# Patient Record
Sex: MALE | Race: WHITE
[De-identification: names, ages, dates, MRNs, and addresses within clinical notes are randomized per-mention and may not be internally consistent; named-entity substitution may affect disease eponyms.]

---

## 2019-12-10 ENCOUNTER — HOSPITAL ENCOUNTER (INPATIENT)
Dept: HOSPITAL 84 - D.ER | Age: 49
LOS: 2 days | Discharge: HOME | DRG: 419 | End: 2019-12-12
Attending: SURGERY | Admitting: SURGERY
Payer: COMMERCIAL

## 2019-12-10 VITALS — SYSTOLIC BLOOD PRESSURE: 138 MMHG | DIASTOLIC BLOOD PRESSURE: 89 MMHG

## 2019-12-10 VITALS — DIASTOLIC BLOOD PRESSURE: 93 MMHG | SYSTOLIC BLOOD PRESSURE: 151 MMHG

## 2019-12-10 VITALS — SYSTOLIC BLOOD PRESSURE: 149 MMHG | DIASTOLIC BLOOD PRESSURE: 96 MMHG

## 2019-12-10 VITALS — BODY MASS INDEX: 29.16 KG/M2 | BODY MASS INDEX: 29.16 KG/M2 | HEIGHT: 73 IN | WEIGHT: 220 LBS

## 2019-12-10 VITALS — SYSTOLIC BLOOD PRESSURE: 138 MMHG | DIASTOLIC BLOOD PRESSURE: 87 MMHG

## 2019-12-10 VITALS — DIASTOLIC BLOOD PRESSURE: 94 MMHG | SYSTOLIC BLOOD PRESSURE: 147 MMHG

## 2019-12-10 VITALS — SYSTOLIC BLOOD PRESSURE: 91 MMHG | DIASTOLIC BLOOD PRESSURE: 58 MMHG

## 2019-12-10 DIAGNOSIS — Z72.0: ICD-10-CM

## 2019-12-10 DIAGNOSIS — R16.0: ICD-10-CM

## 2019-12-10 DIAGNOSIS — K80.00: Primary | ICD-10-CM

## 2019-12-10 LAB
ALBUMIN SERPL-MCNC: 4 G/DL (ref 3.4–5)
ALP SERPL-CCNC: 52 U/L (ref 46–116)
ALT SERPL-CCNC: 32 U/L (ref 10–68)
AMORPHOUS SEDIMENT: (no result) /LPF
AMYLASE SERPL-CCNC: 45 U/L (ref 25–115)
ANION GAP SERPL CALC-SCNC: 9.6 MMOL/L (ref 8–16)
APPEARANCE UR: (no result)
BACTERIA #/AREA URNS HPF: (no result) /HPF
BASOPHILS NFR BLD AUTO: 0.3 % (ref 0–2)
BILIRUB SERPL-MCNC: 0.43 MG/DL (ref 0.2–1.3)
BILIRUB SERPL-MCNC: NEGATIVE MG/DL
BUN SERPL-MCNC: 15 MG/DL (ref 7–18)
CALCIUM SERPL-MCNC: 9.9 MG/DL (ref 8.5–10.1)
CHLORIDE SERPL-SCNC: 105 MMOL/L (ref 98–107)
CO2 SERPL-SCNC: 27.3 MMOL/L (ref 21–32)
COLOR UR: YELLOW
CREAT SERPL-MCNC: 1.1 MG/DL (ref 0.6–1.3)
EOSINOPHIL NFR BLD: 1.5 % (ref 0–7)
ERYTHROCYTE [DISTWIDTH] IN BLOOD BY AUTOMATED COUNT: 12 % (ref 11.5–14.5)
GLOBULIN SER-MCNC: 4 G/L
GLUCOSE SERPL-MCNC: 122 MG/DL (ref 74–106)
GLUCOSE SERPL-MCNC: NEGATIVE MG/DL
HCT VFR BLD CALC: 43.7 % (ref 42–54)
HGB BLD-MCNC: 15.1 G/DL (ref 13.5–17.5)
IMM GRANULOCYTES NFR BLD: 0.1 % (ref 0–5)
KETONES UR STRIP-MCNC: NEGATIVE MG/DL
LIPASE SERPL-CCNC: 129 U/L (ref 73–393)
LYMPHOCYTES NFR BLD AUTO: 19.2 % (ref 15–50)
MAGNESIUM SERPL-MCNC: 2 MG/DL (ref 1.8–2.4)
MCH RBC QN AUTO: 31.4 PG (ref 26–34)
MCHC RBC AUTO-ENTMCNC: 34.6 G/DL (ref 31–37)
MCV RBC: 90.9 FL (ref 80–100)
MONOCYTES NFR BLD: 6 % (ref 2–11)
NEUTROPHILS NFR BLD AUTO: 72.9 % (ref 40–80)
NITRITE UR-MCNC: NEGATIVE MG/ML
OSMOLALITY SERPL CALC.SUM OF ELEC: 277 MOSM/KG (ref 275–300)
PH UR STRIP: 9 [PH] (ref 5–6)
PLATELET # BLD: 188 10X3/UL (ref 130–400)
PMV BLD AUTO: 10.2 FL (ref 7.4–10.4)
POTASSIUM SERPL-SCNC: 3.9 MMOL/L (ref 3.5–5.1)
PROT SERPL-MCNC: 8 G/DL (ref 6.4–8.2)
PROT UR-MCNC: NEGATIVE MG/DL
RBC # BLD AUTO: 4.81 10X6/UL (ref 4.2–6.1)
RBC #/AREA URNS HPF: (no result) /HPF (ref 0–5)
SODIUM SERPL-SCNC: 138 MMOL/L (ref 136–145)
SP GR UR STRIP: 1.01 (ref 1–1.02)
SQUAMOUS #/AREA URNS HPF: (no result) /HPF (ref 0–5)
TROPONIN I SERPL-MCNC: < 0.017 NG/ML (ref 0–0.06)
UROBILINOGEN UR-MCNC: NORMAL MG/DL
WBC # BLD AUTO: 6.8 10X3/UL (ref 4.8–10.8)
WBC #/AREA URNS HPF: (no result) /HPF

## 2019-12-10 NOTE — NUR
MAX LIMIT REACHED ON PCA DEVICE. SHOWED PATIENT SILENT BUTTON. NO FAMILY AT
THIS TIME. CL IN REACH. WCTM

## 2019-12-10 NOTE — NUR
A&O X 4, FAMILY AT BEDSIDE. REPORTS ADEQUATE PAIN CONTROL WITH PCA, DENIES
NEEDS AT THIS TIME. WILL CONTINUE TO MONITOR.

## 2019-12-11 VITALS — DIASTOLIC BLOOD PRESSURE: 83 MMHG | SYSTOLIC BLOOD PRESSURE: 133 MMHG

## 2019-12-11 VITALS — SYSTOLIC BLOOD PRESSURE: 138 MMHG | DIASTOLIC BLOOD PRESSURE: 89 MMHG

## 2019-12-11 VITALS — DIASTOLIC BLOOD PRESSURE: 78 MMHG | SYSTOLIC BLOOD PRESSURE: 134 MMHG

## 2019-12-11 VITALS — SYSTOLIC BLOOD PRESSURE: 140 MMHG | DIASTOLIC BLOOD PRESSURE: 84 MMHG

## 2019-12-11 LAB
ALBUMIN SERPL-MCNC: 3.2 G/DL (ref 3.4–5)
ALP SERPL-CCNC: 44 U/L (ref 46–116)
ALT SERPL-CCNC: 22 U/L (ref 10–68)
ANION GAP SERPL CALC-SCNC: 10.8 MMOL/L (ref 8–16)
BASOPHILS NFR BLD AUTO: 0.1 % (ref 0–2)
BILIRUB SERPL-MCNC: 0.55 MG/DL (ref 0.2–1.3)
BUN SERPL-MCNC: 7 MG/DL (ref 7–18)
CALCIUM SERPL-MCNC: 7.8 MG/DL (ref 8.5–10.1)
CHLORIDE SERPL-SCNC: 106 MMOL/L (ref 98–107)
CO2 SERPL-SCNC: 26.9 MMOL/L (ref 21–32)
CREAT SERPL-MCNC: 0.9 MG/DL (ref 0.6–1.3)
EOSINOPHIL NFR BLD: 1.5 % (ref 0–7)
ERYTHROCYTE [DISTWIDTH] IN BLOOD BY AUTOMATED COUNT: 12.3 % (ref 11.5–14.5)
GLOBULIN SER-MCNC: 3.2 G/L
GLUCOSE SERPL-MCNC: 99 MG/DL (ref 74–106)
HCT VFR BLD CALC: 39.6 % (ref 42–54)
HGB BLD-MCNC: 13.3 G/DL (ref 13.5–17.5)
IMM GRANULOCYTES NFR BLD: 0.3 % (ref 0–5)
LYMPHOCYTES NFR BLD AUTO: 17.8 % (ref 15–50)
MAGNESIUM SERPL-MCNC: 1.6 MG/DL (ref 1.8–2.4)
MCH RBC QN AUTO: 31.3 PG (ref 26–34)
MCHC RBC AUTO-ENTMCNC: 33.6 G/DL (ref 31–37)
MCV RBC: 93.2 FL (ref 80–100)
MONOCYTES NFR BLD: 11.9 % (ref 2–11)
NEUTROPHILS NFR BLD AUTO: 68.4 % (ref 40–80)
OSMOLALITY SERPL CALC.SUM OF ELEC: 276 MOSM/KG (ref 275–300)
PHOSPHATE SERPL-MCNC: 3.6 MG/DL (ref 2.5–4.9)
PLATELET # BLD: 159 10X3/UL (ref 130–400)
PMV BLD AUTO: 10.5 FL (ref 7.4–10.4)
POTASSIUM SERPL-SCNC: 3.7 MMOL/L (ref 3.5–5.1)
PROT SERPL-MCNC: 6.4 G/DL (ref 6.4–8.2)
RBC # BLD AUTO: 4.25 10X6/UL (ref 4.2–6.1)
SODIUM SERPL-SCNC: 140 MMOL/L (ref 136–145)
WBC # BLD AUTO: 7.9 10X3/UL (ref 4.8–10.8)

## 2019-12-11 NOTE — NUR
PT ALERT X 4. BREATH SOUNDS CLEAR BILAT. IV TO LEFT HAND, PATENT, DRESSING
CDI. PT EATING BREAKFAST. PT INSTRUCTED TO BE NPO AND NO PAIN MEDICATION AFTER
BREAKFAST FOR ORDERED SCANS. PT REPORTING NO PAIN AT THIS TIME. BED LOW, CALL
LIGHT IN REACH. NO OTHER NEEDS AT THIS TIME.

## 2019-12-12 VITALS — SYSTOLIC BLOOD PRESSURE: 138 MMHG | DIASTOLIC BLOOD PRESSURE: 84 MMHG

## 2019-12-12 VITALS — DIASTOLIC BLOOD PRESSURE: 63 MMHG | SYSTOLIC BLOOD PRESSURE: 113 MMHG

## 2019-12-12 VITALS — SYSTOLIC BLOOD PRESSURE: 156 MMHG | DIASTOLIC BLOOD PRESSURE: 86 MMHG

## 2019-12-12 VITALS — SYSTOLIC BLOOD PRESSURE: 107 MMHG | DIASTOLIC BLOOD PRESSURE: 66 MMHG

## 2019-12-12 VITALS — SYSTOLIC BLOOD PRESSURE: 129 MMHG | DIASTOLIC BLOOD PRESSURE: 65 MMHG

## 2019-12-12 VITALS — DIASTOLIC BLOOD PRESSURE: 80 MMHG | SYSTOLIC BLOOD PRESSURE: 127 MMHG

## 2019-12-12 VITALS — DIASTOLIC BLOOD PRESSURE: 84 MMHG | SYSTOLIC BLOOD PRESSURE: 138 MMHG

## 2019-12-12 VITALS — SYSTOLIC BLOOD PRESSURE: 120 MMHG | DIASTOLIC BLOOD PRESSURE: 76 MMHG

## 2019-12-12 VITALS — SYSTOLIC BLOOD PRESSURE: 121 MMHG | DIASTOLIC BLOOD PRESSURE: 60 MMHG

## 2019-12-12 VITALS — DIASTOLIC BLOOD PRESSURE: 70 MMHG | SYSTOLIC BLOOD PRESSURE: 109 MMHG

## 2019-12-12 PROCEDURE — BF121ZZ FLUOROSCOPY OF GALLBLADDER USING LOW OSMOLAR CONTRAST: ICD-10-PCS | Performed by: SURGERY

## 2019-12-12 PROCEDURE — 0FB04ZX EXCISION OF LIVER, PERCUTANEOUS ENDOSCOPIC APPROACH, DIAGNOSTIC: ICD-10-PCS | Performed by: SURGERY

## 2019-12-12 PROCEDURE — 0FT44ZZ RESECTION OF GALLBLADDER, PERCUTANEOUS ENDOSCOPIC APPROACH: ICD-10-PCS | Performed by: SURGERY

## 2019-12-12 NOTE — NUR
PT DISCHARGED. IV TAKEN OUT. DISCHARGE PACKET GIVEN. WRITTEN SCRIPTS GIVEN. PT
ALERT AND ORINETED. NO SIGNS OF DISTRESS. PT STABLE UPON DISCHARGE.

## 2019-12-12 NOTE — NUR
PAIN IMPROVED AFTER USE OF PCA TO CALM ABDOMEN DOWN. WILL MONITOR. UP
AMBULATED IN HALLWAY PER SELF. NO CHANGES NOTED.

## 2019-12-12 NOTE — NUR
AMBULATED IN HALLWAY WITH  FEET. ATE MOST OF REGULAR LUNCH TRAY WITHOUT
C/O NAUSEA OR PAIN. WILL MONITOR. CALLED DR. CASTELLON RE PO PAIN MEDS. NEW
ORDERS
 RECEIVED.

## 2019-12-12 NOTE — NUR
RETURNED FROM SURGERY. FAMILY AT BEDSIDE. A/O X3. C/O ABDOMINAL PAIN LEVEL 8.
WILL MONITOR. PCA PUMP PLACED. LUNGS ARE CLEAR BILATERALLY, NO COUGH NOTED.
SKIN IS INTACT WITHOUT REDNESS EXCEPT 6 SMALL INSERTION SITES TO ABDOMEN WHICH
HAVE DRY INTACT DRESSINGS IN PLACE. IV TO LEFT HAND IS PATENT WITHOUT REDNESS
AT INSERTION SITE. DENIES NEEDS.

## 2019-12-27 NOTE — OP
PATIENT NAME:  MIRNA BRUNER                               MEDICAL RECORD: [id number]
:74                                             LOCATION:[id number]
                                                         ADMISSION DATE:12/10/19
SURGEON:  KACIE CASTELLON MD            
 
 
DATE OF OPERATION:  2019
 
PREOPERATIVE DIAGNOSIS:  Symptomatic gallstones.
 
POSTOPERATIVE DIAGNOSES:
1. Acute cholecystitis.
2. Symptomatic gallstones.
3. Hepatomegaly.
 
PROCEDURES:
1. Laparoscopic cholecystectomy.
2. Intraoperative cholangiography without immediate surgeon interpretation.
3. A 14-gauge core needle liver biopsy.
 
SURGEON:  Kacie Castellon MD
 
ASSISTANT:  None.
 
BLOOD LOSS:  Minimal.
 
ANESTHESIA:  General.
 
COMPLICATIONS:  None.
 
The risks, possible complications and alternatives to the procedure were
explained to the patient.  He elects to proceed.  The discussion specifically
included, but were not limited to, bleeding requiring emergency reoperation,
infection, intestinal injury, and common bile duct injury.
 
The indication for the liver biopsy was hepatomegaly.
 
OPERATIVE COURSE:  The patient was conveyed to the operating room electively on
2019.  General anesthesia was induced by the anesthesia staff.  The
abdomen was sterilely prepped and draped.  A small skin nick was accomplished in
the left upper quadrant.  Veress needle was inserted through the skin nick into
the peritoneal cavity.  CO2 insufflation was begun.  Once a sufficient
pneumoperitoneum had been achieved, a 5-mm trocar was inserted through an
incision in the left upper quadrant.  Under direct internal vision utilizing a
television camera, a 12-mm trocar was inserted through an incision at the
umbilicus.  Another 5-mm trocar was inserted in the epigastrium.  Another 5-mm
trocar was inserted far laterally in the right upper quadrant.  During insertion
of the Veress needle and all trocars, there appeared to have been no injury to
the bowels, any intraperitoneal or retroperitoneal structures.
 
Under laparoscopic guidance, I percutaneously accessed the right upper quadrant
utilizing a 14-gauge core biopsy device.  Cores were obtained over the convexity
of the liver.  The biopsy sites were made hemostatic with electrocautery.
 
I then advanced a cholangiogram trocar.  I punctured the fundus of the
gallbladder.  I aspirated bile.  I then injected dye.  Real time
cholangiographic images were obtained and these were sent to the radiologist for
interpretation.
 
 
 
OPERATIVE REPORT                               [id number]    MIRNA BRUNER             
 
 
 
I aspirated bile and removed the cholangiogram trocar.
 
The gallbladder was grasped and directed cephalad.  The infundibulum was grasped
and retracted laterally.  Blunt dissection was begun in the triangle of Calot. 
One cystic artery and one cystic duct were identified.  These were clipped
multiply and divided between clips.
 
The gallbladder was then excised from its bed in the liver.  It was placed
within a bag retrieval device and was withdrawn through the umbilical fascia
defect.
 
The 12-mm trocar was placed and the abdomen reinsufflated.  I irrigated and
aspirated the right upper quadrant.  There was no bleeding even at low pressure
of 8.  The 12-mm trocar was removed.  Utilizing the Jhonatan-Natasha suture
closure device and 0 Vicryl sutures, I closed the fascia at the umbilicus.  All
the trocars were removed and the abdomen desufflated.
 
The skin at the umbilicus was closed with interrupted 4-0 Vicryl Rapide sutures.
 The other skin incision was closed with interrupted intracuticular 3-0 Vicryls.
 Benzoin and Steri-Strips were applied.
 
The patient was then extubated and conveyed to post-anesthesia care unit where
he was in stable condition.
 
TRANSINT:[id number] Voice Confirmation ID: [id number] DOCUMENT ID: [id number]
                                           
                                           KACIE CASTELLON MD            
 
 
 
Electronically Signed by KACIE CASTELLON on 19 at 0849
 
 
 
 
 
 
 
 
 
 
 
 
 
 
 
CC: GAURAV THOMPSON DO                                         [id number]
DICTATION DATE: 19     :     19 0108      DIS IN  
                                                                      19
[facility]                                          
[address] [place] [address]